# Patient Record
Sex: FEMALE | Race: AMERICAN INDIAN OR ALASKA NATIVE
[De-identification: names, ages, dates, MRNs, and addresses within clinical notes are randomized per-mention and may not be internally consistent; named-entity substitution may affect disease eponyms.]

---

## 2018-03-18 ENCOUNTER — HOSPITAL ENCOUNTER (EMERGENCY)
Dept: HOSPITAL 25 - ED | Age: 68
Discharge: HOME | End: 2018-03-18
Payer: MEDICARE

## 2018-03-18 DIAGNOSIS — R53.1: ICD-10-CM

## 2018-03-18 DIAGNOSIS — M79.604: ICD-10-CM

## 2018-03-18 DIAGNOSIS — M54.16: Primary | ICD-10-CM

## 2018-03-18 PROCEDURE — 36415 COLL VENOUS BLD VENIPUNCTURE: CPT

## 2018-03-18 PROCEDURE — 85025 COMPLETE CBC W/AUTO DIFF WBC: CPT

## 2018-03-18 PROCEDURE — 86140 C-REACTIVE PROTEIN: CPT

## 2018-03-18 PROCEDURE — 96375 TX/PRO/DX INJ NEW DRUG ADDON: CPT

## 2018-03-18 PROCEDURE — 96374 THER/PROPH/DIAG INJ IV PUSH: CPT

## 2018-03-18 PROCEDURE — 99283 EMERGENCY DEPT VISIT LOW MDM: CPT

## 2018-03-18 PROCEDURE — 80053 COMPREHEN METABOLIC PANEL: CPT

## 2018-03-19 VITALS — SYSTOLIC BLOOD PRESSURE: 165 MMHG | DIASTOLIC BLOOD PRESSURE: 67 MMHG

## 2018-03-19 LAB
BASOPHILS # BLD AUTO: 0 10^3/UL (ref 0–0.2)
EOSINOPHIL # BLD AUTO: 0.4 10^3/UL (ref 0–0.6)
HCT VFR BLD AUTO: 37 % (ref 35–47)
HGB BLD-MCNC: 12.5 G/DL (ref 12–16)
LYMPHOCYTES # BLD AUTO: 1.5 10^3/UL (ref 1–4.8)
MCH RBC QN AUTO: 32 PG (ref 27–31)
MCHC RBC AUTO-ENTMCNC: 34 G/DL (ref 31–36)
MCV RBC AUTO: 96 FL (ref 80–97)
MONOCYTES # BLD AUTO: 0.5 10^3/UL (ref 0–0.8)
NEUTROPHILS # BLD AUTO: 2.5 10^3/UL (ref 1.5–7.7)
NRBC # BLD AUTO: 0 10^3/UL
NRBC BLD QL AUTO: 0.1
PLATELET # BLD AUTO: 139 10^3/UL (ref 150–450)
RBC # BLD AUTO: 3.87 10^6/UL (ref 4–5.4)
WBC # BLD AUTO: 5 10^3/UL (ref 3.5–10.8)

## 2018-03-19 NOTE — ED
Martín OTOOLE Julia, scribed for Salina Singh MD on 03/18/18 at 2331 .





Lower Extremity





- HPI Summary


HPI Summary: 





This patient is a 68 year old F presenting to [facility] with a chief complaint of 

RLE pain and weakness for the past week worsening the past two days. She states 

the pain is typically worse at night. The patient rates the pain 10/10 in 

severity. Patient denies back pain, trauma, and urinary symptoms. She states 

she has been walking with cane recently due to pain. She took a muscle relaxer 

at 22:00 without relief. Patient had spinal stenosis surgery on 2/13/2018, 

performed by Dr. Mckinney at [place]





- History of Current Complaint


Chief Complaint: EDExtremityLower


Stated Complaint: RIGHT LEG PAIN


Time Seen by Provider: 03/18/18 23:17


Hx Obtained From: Patient


Onset of Pain: Days


Onset/Duration: Worse Since - past two days


Pain Intensity: 10


Pain Scale Used: 0-10 Numeric


Timing: Constant


Location: Is Discrete @ - RLE


Associated Signs And Symptoms: Positive: Weakness


Aggravating Factor(s): Ambulation


Able to Bear Weight: Yes





- Allergies/Home Medications


Allergies/Adverse Reactions: 


 Allergies











Allergy/AdvReac Type Severity Reaction Status Date / Time


 


codeine Allergy  Nausea And Verified 03/18/18 22:59





   Vomiting  


 


DAIRY Allergy  Runny Nose Uncoded 03/18/18 22:59


 


ENVIRONMENTAL Allergy  Sneezing Uncoded 03/18/18 22:59


 


MUSHROOMS Allergy  MIGRAINES Uncoded 03/18/18 22:59


 


YOGURT Allergy  STUFFY, Uncoded 03/18/18 22:59





   RUNNY NOSE  














PMH/Surg Hx/FS Hx/Imm Hx


Endocrine/Hematology History: Reports: Hx Thyroid Disease - HYPOTHYROID, Hx 

Anemia - HX


   Denies: Hx Diabetes


Cardiovascular History: Reports: Hx Hypertension - ON MEDICATION FOR, Hx 

Peripheral Vascular Disease - Vericose Veins


   Denies: Hx Pacemaker/ICD


Respiratory History: Reports: Hx Asthma - ALLERGY INDUCED, Hx Sleep Apnea - ???


GI History: Reports: Other GI Disorders - HX OF GASTROPLASTY


Musculoskeletal History: Reports: Hx Arthritis, Hx Bursitis, Hx Tendonitis, 

Other Musculoskeletal History - Left total knee replacement


Sensory History: Reports: Hx Cataracts, Hx Contacts or Glasses


   Denies: Hx Hearing Aid


Opthamlomology History: Reports: Hx Cataracts, Hx Contacts or Glasses


Neurological History: Reports: Hx Headaches, Hx Migraine - HX OF IN THE PAST- 

NOW INFREQUENT, Hx Transient Ischemic Attacks (TIA) - 2010, Other Neuro 

Impairments/Disorders - Vertigo


Psychiatric History: 


   Denies: Hx Panic Disorder





- Cancer History


Cancer Type, Location and Year: Melanoma left forearm- removed July 2015





- Surgical History


Surgery Procedure, Year, and Place: 02/2013-LEFT TOTAL KNEE REPLACEMENT-[facility].  

VARICOSE VEIN -2012.  STOMACH STAPELING- AGE 33.  REPAIR RIGHT LEG FRACTURE- 

[place]-.  EXCISION LEFT INDEX FINGER.  Melanoma removed Left Forearm July 2015.  EXCISION RIGHT MIDDLE FINGER.  BILATERAL CARPAL TUNNEL RELEASE


Hx Anesthesia Reactions: Yes - DIFFICULTY PLACING SPINAL


Infectious Disease History: Yes


Infectious Disease History: Reports: Hx Shingles


   Denies: Traveled Outside the [place] in Last 30 Days





- Family History


Known Family History: Positive: Hypertension





- Social History


Alcohol Use: None


Substance Use Type: Reports: None


Smoking Status (MU): Never Smoked Tobacco


Have You Smoked in the Last Year: No





Review of Systems


Positive: no symptoms reported


Musculoskeletal: Negative - back pain


Positive: Myalgia - RLE pain


Positive: Weakness - RLE


All Other Systems Reviewed And Are Negative: Yes





Physical Exam





- Summary


Physical Exam Summary: 





VITAL SIGNS: Reviewed.


GENERAL:  Patient is a well-developed and nourished female who is lying 

comfortable in the stretcher. Patient is not in any acute respiratory distress.


HEAD AND FACE: No signs of trauma. No ecchymosis, hematomas or skull 

depressions. No sinus tenderness.


EYES: PERRLA, EOMI x 2, No injected conjunctiva, no nystagmus.


EARS: Hearing grossly intact. Ear canals and tympanic membranes are within 

normal limits.


MOUTH: Oropharynx within normal limits.


NECK: Supple, trachea is midline, no adenopathy, no JVD, no carotid bruit, no c-

spine tenderness, neck with full ROM.


CHEST: Symmetric, no tenderness at palpation


SKIN: Dry and warm


LUNGS: Clear to auscultation bilaterally. No wheezing or crackles.


CVS: Regular rate and rhythm, S1 and S2 present, no murmurs or gallops 

appreciated.


ABDOMEN: Soft, non-tender. No signs of distention. No rebound no guarding, and 

no masses palpated. Bowel sounds are normal.


EXTREMITIES: FROM in all major joints, no edema, no cyanosis or clubbing. 

Tenderness over anterior aspect of right thigh. Positive straight leg test at 

20 degrees. No lumbosacral tenderness, with a well healed incision.


NEURO: Alert and oriented x 3. Speech is normal and follows commands. Decreased 

sensation over anterior aspect of right leg and foot. Mild weakness with 

dorsiflexion of right ankle.





Triage Information Reviewed: Yes


Vital Signs On Initial Exam: 


 Initial Vitals











Temp Pulse Resp BP Pulse Ox


 


 97.5 F   71   16   168/88   96 


 


 03/18/18 22:54  03/18/18 22:54  03/18/18 22:54  03/18/18 22:54  03/18/18 22:54











Vital Signs Reviewed: Yes





Diagnostics





- Vital Signs


 Vital Signs











  Temp Pulse Resp BP Pulse Ox


 


 03/18/18 22:54  97.5 F  71  16  168/88  96














- Laboratory


Result Diagrams: 


 03/19/18 00:15





 03/19/18 00:15


Lab Statement: Any lab studies that have been ordered have been reviewed, and 

results considered in the medical decision making process.





Re-Evaluation





- Re-Evaluation


  ** 1


Re-Evaluation Time: 12:51


Change: Improved - Patient states pain is improved. Patient can ambulate 

without difficulty.


Comment: Patient's pain is improved. Patient can ambulate well. Discussed 

discharge with patient.





Lower Extremity Course/Dx





- Course


Course Of Treatment: Patient presents with RLE pain and weakness for the past 

week worsening the past two days. Patient denies back pain, trauma, and urinary 

symptoms.  Patient had spinal stenosis surgery on 2/13/2018, performed by Dr. Mckinney at [place]. Bloodwork is unremarkable. Patient is given Decadron, 

Fentanyl, Toradol, Zofran, and Lyrica. While in ED patient states her pain has 

improved and she is able to ambulate. Patient is instructed to follow up with 

her surgeon tomorrow.





- Diagnoses


Provider Diagnoses: 


 Lumbar radiculopathy








Discharge





- Discharge Plan


Condition: Stable


Disposition: HOME


Prescriptions: 


Dexamethasone TAB* [Decadron TAB*] 4 mg PO TID #14 tab


oxyCODONE/Acetamin 5/325 MG* [Percocet 5/325 TAB*] 1 tab PO Q6H PRN #14 tab MDD 

4


 PRN Reason: Pain


Patient Education Materials:  Lumbar Radiculopathy (ED)


Referrals: 


Cedric Reeves MD [Primary Care Provider] - 


Additional Instructions: 


Follow up with your neurosurgeon, Dr. Mckinney, tomorrow.








RETURN TO THE EMERGENCY DEPARTMENT FOR CHANGING OR WORSENING SYMPTOMS.





The documentation as recorded by the Martín yi Julia accurately reflects 

the service I personally performed and the decisions made by me, Salina Singh MD.